# Patient Record
Sex: FEMALE | Race: WHITE | NOT HISPANIC OR LATINO
[De-identification: names, ages, dates, MRNs, and addresses within clinical notes are randomized per-mention and may not be internally consistent; named-entity substitution may affect disease eponyms.]

---

## 2020-03-08 ENCOUNTER — FORM ENCOUNTER (OUTPATIENT)
Age: 45
End: 2020-03-08

## 2020-08-02 ENCOUNTER — FORM ENCOUNTER (OUTPATIENT)
Age: 45
End: 2020-08-02

## 2020-11-08 ENCOUNTER — FORM ENCOUNTER (OUTPATIENT)
Age: 45
End: 2020-11-08

## 2021-06-03 PROBLEM — Z00.00 ENCOUNTER FOR PREVENTIVE HEALTH EXAMINATION: Status: ACTIVE | Noted: 2021-06-03

## 2021-06-08 DIAGNOSIS — Z78.9 OTHER SPECIFIED HEALTH STATUS: ICD-10-CM

## 2021-06-08 DIAGNOSIS — Z98.890 OTHER SPECIFIED POSTPROCEDURAL STATES: ICD-10-CM

## 2021-06-09 ENCOUNTER — APPOINTMENT (OUTPATIENT)
Dept: BREAST CENTER | Facility: CLINIC | Age: 46
End: 2021-06-09
Payer: COMMERCIAL

## 2021-06-09 ENCOUNTER — NON-APPOINTMENT (OUTPATIENT)
Age: 46
End: 2021-06-09

## 2021-06-09 VITALS
DIASTOLIC BLOOD PRESSURE: 84 MMHG | HEIGHT: 61 IN | SYSTOLIC BLOOD PRESSURE: 123 MMHG | WEIGHT: 166 LBS | BODY MASS INDEX: 31.34 KG/M2

## 2021-06-09 DIAGNOSIS — Z87.09 PERSONAL HISTORY OF OTHER DISEASES OF THE RESPIRATORY SYSTEM: ICD-10-CM

## 2021-06-09 DIAGNOSIS — Z88.9 ALLERGY STATUS TO UNSPECIFIED DRUGS, MEDICAMENTS AND BIOLOGICAL SUBSTANCES: ICD-10-CM

## 2021-06-09 DIAGNOSIS — Z87.19 PERSONAL HISTORY OF OTHER DISEASES OF THE DIGESTIVE SYSTEM: ICD-10-CM

## 2021-06-09 DIAGNOSIS — K82.4 CHOLESTEROLOSIS OF GALLBLADDER: ICD-10-CM

## 2021-06-09 DIAGNOSIS — N63.0 UNSPECIFIED LUMP IN UNSPECIFIED BREAST: ICD-10-CM

## 2021-06-09 PROCEDURE — 99213 OFFICE O/P EST LOW 20 MIN: CPT

## 2021-06-09 PROCEDURE — 99072 ADDL SUPL MATRL&STAF TM PHE: CPT

## 2021-06-09 NOTE — PAST MEDICAL HISTORY
[Menstruating] : The patient is menstruating [Definite ___ (Date)] : the last menstrual period was [unfilled] [Total Preg ___] : G[unfilled] [Live Births ___] : P[unfilled]  [Age At Live Birth ___] : Age at live birth: [unfilled] [History of Hormone Replacement Treatment] : has no history of hormone replacement treatment

## 2021-06-09 NOTE — HISTORY OF PRESENT ILLNESS
[FreeTextEntry1] : Left breast pain\par Left breast mass, fibroadenoma\par \par Patient still complaining of left breast pain not associated with any breast masses or nipple discharge.  She has not followed my advice and still drinks coffee and does not wear a bra at night.  Her last mammogram and ultrasound in November of last year was unremarkable.  Patient's had no change in.  And is not taking any hormones.  Patient has no family history of breast or ovarian cancer.  In March 2018 she had a benign left core biopsy which showed a fibroadenoma.

## 2021-11-08 ENCOUNTER — APPOINTMENT (OUTPATIENT)
Dept: BREAST CENTER | Facility: CLINIC | Age: 46
End: 2021-11-08
Payer: COMMERCIAL

## 2021-11-08 VITALS — WEIGHT: 160 LBS | BODY MASS INDEX: 30.21 KG/M2 | HEIGHT: 61 IN

## 2021-11-08 DIAGNOSIS — Z87.19 PERSONAL HISTORY OF OTHER DISEASES OF THE DIGESTIVE SYSTEM: ICD-10-CM

## 2021-11-08 DIAGNOSIS — Z86.39 PERSONAL HISTORY OF OTHER ENDOCRINE, NUTRITIONAL AND METABOLIC DISEASE: ICD-10-CM

## 2021-11-08 PROCEDURE — 99213 OFFICE O/P EST LOW 20 MIN: CPT

## 2021-11-08 PROCEDURE — 99072 ADDL SUPL MATRL&STAF TM PHE: CPT

## 2021-11-08 RX ORDER — ALBUTEROL 90 MCG
AEROSOL (GRAM) INHALATION
Refills: 0 | Status: DISCONTINUED | COMMUNITY
End: 2021-11-08

## 2021-11-08 RX ORDER — ALBUTEROL 90 MCG
AEROSOL (GRAM) INHALATION
Refills: 0 | Status: ACTIVE | COMMUNITY

## 2021-11-08 RX ORDER — FOLIC ACID 1 MG/1
1 TABLET ORAL
Refills: 0 | Status: DISCONTINUED | COMMUNITY
End: 2021-11-08

## 2021-11-08 NOTE — HISTORY OF PRESENT ILLNESS
[FreeTextEntry1] : Left breast pain\par Left breast mass, fibroadenoma\par \par Patient still complaining of left breast paiin, but it significantly improved and not associated with any breast masses or nipple discharge.   \par Patient's had no change in her health and is not taking any hormones.  Patient has no family history of breast or ovarian cancer.  In March 2018 she had a benign left core biopsy which showed a fibroadenoma.

## 2021-11-08 NOTE — PAST MEDICAL HISTORY
[Menstruating] : The patient is menstruating [History of Hormone Replacement Treatment] : has no history of hormone replacement treatment [Definite ___ (Date)] : the last menstrual period was [unfilled] [Total Preg ___] : G[unfilled] [Live Births ___] : P[unfilled]  [Age At Live Birth ___] : Age at live birth: [unfilled]

## 2022-12-28 ENCOUNTER — APPOINTMENT (OUTPATIENT)
Dept: BREAST CENTER | Facility: CLINIC | Age: 47
End: 2022-12-28
Payer: COMMERCIAL

## 2022-12-28 VITALS — TEMPERATURE: 97.2 F | BODY MASS INDEX: 29.27 KG/M2 | WEIGHT: 155 LBS | HEIGHT: 61 IN

## 2022-12-28 PROCEDURE — 99213 OFFICE O/P EST LOW 20 MIN: CPT

## 2022-12-28 NOTE — HISTORY OF PRESENT ILLNESS
[FreeTextEntry1] : Left breast pain\par Left breast mass, fibroadenoma\par \par Patient's breast pain gas significantly improved and not associated with any breast masses or nipple discharge.  Recent mammogram and ultrasound was unremarkable BI-RADS 2.\par    \par Patient's had no change in her health and is not taking any hormones.  Patient has no family history of breast or ovarian cancer.  In March 2018 she had a benign left core biopsy which showed a fibroadenoma.

## 2024-02-02 ENCOUNTER — APPOINTMENT (OUTPATIENT)
Dept: BREAST CENTER | Facility: CLINIC | Age: 49
End: 2024-02-02
Payer: COMMERCIAL

## 2024-02-02 VITALS
HEIGHT: 62 IN | OXYGEN SATURATION: 99 % | BODY MASS INDEX: 30 KG/M2 | SYSTOLIC BLOOD PRESSURE: 127 MMHG | WEIGHT: 163 LBS | HEART RATE: 85 BPM | DIASTOLIC BLOOD PRESSURE: 84 MMHG

## 2024-02-02 DIAGNOSIS — N64.4 MASTODYNIA: ICD-10-CM

## 2024-02-02 DIAGNOSIS — N60.12 DIFFUSE CYSTIC MASTOPATHY OF LEFT BREAST: ICD-10-CM

## 2024-02-02 PROCEDURE — 99212 OFFICE O/P EST SF 10 MIN: CPT

## 2025-03-06 DIAGNOSIS — Z12.31 ENCOUNTER FOR SCREENING MAMMOGRAM FOR MALIGNANT NEOPLASM OF BREAST: ICD-10-CM

## 2025-03-07 ENCOUNTER — APPOINTMENT (OUTPATIENT)
Dept: BREAST CENTER | Facility: CLINIC | Age: 50
End: 2025-03-07
Payer: COMMERCIAL

## 2025-03-07 VITALS
SYSTOLIC BLOOD PRESSURE: 126 MMHG | BODY MASS INDEX: 29.25 KG/M2 | WEIGHT: 149 LBS | HEART RATE: 83 BPM | HEIGHT: 60 IN | DIASTOLIC BLOOD PRESSURE: 87 MMHG | OXYGEN SATURATION: 96 %

## 2025-03-07 DIAGNOSIS — R92.323 MAMMOGRAPHIC FIBROGLANDULAR DENSITY, BILATERAL BREASTS: ICD-10-CM

## 2025-03-07 PROCEDURE — 99212 OFFICE O/P EST SF 10 MIN: CPT

## 2025-03-10 PROBLEM — R92.323 SCATTERED FIBROGLANDULAR TISSUE DENSITY OF BOTH BREASTS ON MAMMOGRAPHY: Status: ACTIVE | Noted: 2025-03-06
